# Patient Record
Sex: FEMALE | Race: WHITE | NOT HISPANIC OR LATINO | Employment: UNEMPLOYED | URBAN - NONMETROPOLITAN AREA
[De-identification: names, ages, dates, MRNs, and addresses within clinical notes are randomized per-mention and may not be internally consistent; named-entity substitution may affect disease eponyms.]

---

## 2017-09-21 ENCOUNTER — TRANSCRIBE ORDERS (OUTPATIENT)
Dept: ADMINISTRATIVE | Facility: HOSPITAL | Age: 77
End: 2017-09-21

## 2017-09-21 DIAGNOSIS — R55 SYNCOPE AND COLLAPSE: Primary | ICD-10-CM

## 2017-09-25 ENCOUNTER — TRANSCRIBE ORDERS (OUTPATIENT)
Dept: ADMINISTRATIVE | Facility: HOSPITAL | Age: 77
End: 2017-09-25

## 2017-09-25 ENCOUNTER — HOSPITAL ENCOUNTER (OUTPATIENT)
Dept: CARDIOLOGY | Facility: HOSPITAL | Age: 77
Discharge: HOME | End: 2017-09-25
Admitting: NURSE PRACTITIONER

## 2017-09-25 ENCOUNTER — HOSPITAL ENCOUNTER (OUTPATIENT)
Dept: RESPIRATORY THERAPY | Facility: HOSPITAL | Age: 77
Discharge: HOME | End: 2017-09-25

## 2017-09-25 DIAGNOSIS — R55 SYNCOPE AND COLLAPSE: ICD-10-CM

## 2017-09-25 DIAGNOSIS — R55 SYNCOPE AND COLLAPSE: Primary | ICD-10-CM

## 2017-09-25 PROCEDURE — 93306 TTE W/DOPPLER COMPLETE: CPT

## 2017-09-25 PROCEDURE — 93226 XTRNL ECG REC<48 HR SCAN A/R: CPT

## 2017-09-25 PROCEDURE — 93225 XTRNL ECG REC<48 HRS REC: CPT

## 2017-09-25 PROCEDURE — 93227 XTRNL ECG REC<48 HR R&I: CPT | Performed by: INTERNAL MEDICINE

## 2017-09-25 PROCEDURE — 93306 TTE W/DOPPLER COMPLETE: CPT | Performed by: INTERNAL MEDICINE

## 2017-09-26 LAB
BH CV ECHO MEAS - % IVS THICK: 43.7 %
BH CV ECHO MEAS - % LVPW THICK: 85.9 %
BH CV ECHO MEAS - ACS: 2.1 CM
BH CV ECHO MEAS - AO ROOT AREA (BSA CORRECTED): 1.6
BH CV ECHO MEAS - AO ROOT AREA: 7.1 CM^2
BH CV ECHO MEAS - AO ROOT DIAM: 3 CM
BH CV ECHO MEAS - BSA(HAYCOCK): 1.9 M^2
BH CV ECHO MEAS - BSA: 1.9 M^2
BH CV ECHO MEAS - BZI_BMI: 25.8 KILOGRAMS/M^2
BH CV ECHO MEAS - BZI_METRIC_HEIGHT: 170.2 CM
BH CV ECHO MEAS - BZI_METRIC_WEIGHT: 74.8 KG
BH CV ECHO MEAS - CONTRAST EF 4CH: 66.2 ML/M^2
BH CV ECHO MEAS - EDV(CUBED): 121.3 ML
BH CV ECHO MEAS - EDV(MOD-SP4): 68 ML
BH CV ECHO MEAS - EDV(TEICH): 115.5 ML
BH CV ECHO MEAS - EF(CUBED): 69.2 %
BH CV ECHO MEAS - EF(TEICH): 60.6 %
BH CV ECHO MEAS - ESV(CUBED): 37.3 ML
BH CV ECHO MEAS - ESV(MOD-SP4): 23 ML
BH CV ECHO MEAS - ESV(TEICH): 45.5 ML
BH CV ECHO MEAS - FS: 32.5 %
BH CV ECHO MEAS - IVS/LVPW: 1.1
BH CV ECHO MEAS - IVSD: 0.94 CM
BH CV ECHO MEAS - IVSS: 1.4 CM
BH CV ECHO MEAS - LA DIMENSION: 2.7 CM
BH CV ECHO MEAS - LA/AO: 0.88
BH CV ECHO MEAS - LV DIASTOLIC VOL/BSA (35-75): 36.5 ML/M^2
BH CV ECHO MEAS - LV MASS(C)D: 152.5 GRAMS
BH CV ECHO MEAS - LV MASS(C)DI: 81.8 GRAMS/M^2
BH CV ECHO MEAS - LV MASS(C)S: 171.5 GRAMS
BH CV ECHO MEAS - LV MASS(C)SI: 92.1 GRAMS/M^2
BH CV ECHO MEAS - LV SYSTOLIC VOL/BSA (12-30): 12.3 ML/M^2
BH CV ECHO MEAS - LVIDD: 5.9 CM
BH CV ECHO MEAS - LVIDS: 3.3 CM
BH CV ECHO MEAS - LVLD AP4: 6.3 CM
BH CV ECHO MEAS - LVLS AP4: 5.4 CM
BH CV ECHO MEAS - LVOT AREA (M): 2.5 CM^2
BH CV ECHO MEAS - LVOT AREA: 2.6 CM^2
BH CV ECHO MEAS - LVOT DIAM: 1.8 CM
BH CV ECHO MEAS - LVPWD: 0.83 CM
BH CV ECHO MEAS - LVPWS: 1.5 CM
BH CV ECHO MEAS - MV A MAX VEL: 67.9 CM/SEC
BH CV ECHO MEAS - MV E MAX VEL: 77.6 CM/SEC
BH CV ECHO MEAS - MV E/A: 1.1
BH CV ECHO MEAS - PA ACC SLOPE: 1388 CM/SEC^2
BH CV ECHO MEAS - PA ACC TIME: 0.09 SEC
BH CV ECHO MEAS - PA PR(ACCEL): 39.4 MMHG
BH CV ECHO MEAS - RAP SYSTOLE: 10 MMHG
BH CV ECHO MEAS - RVSP: 42.2 MMHG
BH CV ECHO MEAS - SI(CUBED): 45.1 ML/M^2
BH CV ECHO MEAS - SI(MOD-SP4): 24.2 ML/M^2
BH CV ECHO MEAS - SI(TEICH): 37.6 ML/M^2
BH CV ECHO MEAS - SV(CUBED): 84 ML
BH CV ECHO MEAS - SV(MOD-SP4): 45 ML
BH CV ECHO MEAS - SV(TEICH): 70 ML
BH CV ECHO MEAS - TR MAX VEL: 283.7 CM/SEC

## 2017-10-30 ENCOUNTER — OFFICE VISIT (OUTPATIENT)
Dept: CARDIOLOGY | Facility: CLINIC | Age: 77
End: 2017-10-30

## 2017-10-30 ENCOUNTER — CLINICAL SUPPORT (OUTPATIENT)
Dept: CARDIOLOGY | Facility: CLINIC | Age: 77
End: 2017-10-30

## 2017-10-30 VITALS
BODY MASS INDEX: 26.2 KG/M2 | HEART RATE: 73 BPM | SYSTOLIC BLOOD PRESSURE: 125 MMHG | RESPIRATION RATE: 16 BRPM | HEIGHT: 66 IN | DIASTOLIC BLOOD PRESSURE: 81 MMHG | WEIGHT: 163 LBS

## 2017-10-30 DIAGNOSIS — I10 ESSENTIAL HYPERTENSION: ICD-10-CM

## 2017-10-30 DIAGNOSIS — R55 SYNCOPE, UNSPECIFIED SYNCOPE TYPE: Primary | ICD-10-CM

## 2017-10-30 DIAGNOSIS — R55 SYNCOPE, UNSPECIFIED SYNCOPE TYPE: ICD-10-CM

## 2017-10-30 PROCEDURE — 99204 OFFICE O/P NEW MOD 45 MIN: CPT | Performed by: INTERNAL MEDICINE

## 2017-10-30 PROCEDURE — 93000 ELECTROCARDIOGRAM COMPLETE: CPT | Performed by: INTERNAL MEDICINE

## 2017-10-30 RX ORDER — MECLIZINE HCL 25MG 25 MG/1
25 TABLET, CHEWABLE ORAL EVERY 8 HOURS SCHEDULED
COMMUNITY
End: 2019-01-05

## 2017-10-30 RX ORDER — ATENOLOL 25 MG/1
25 TABLET ORAL DAILY
COMMUNITY
End: 2017-11-30 | Stop reason: SDUPTHER

## 2017-10-30 NOTE — PROGRESS NOTES
GAIL Kate  Hiwot Mac  1940  10/30/2017    Patient Active Problem List   Diagnosis   • Syncope   • Essential hypertension       Dear GAIL Kate:    Subjective     Hiwot Mac is a 76 y.o. female with the problems as listed above, presents for complaints of syncope.    History of Present Illness: Ms. Mac is a pleasant 75  female with no history of known heart disease or coronary artery disease or known cardiac arrhythmias, has been referred here by her PCP after she had an episode of near syncope about 3 weeks ago while she was at work.  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  She regained consciousness within a few seconds.  She denied any associated symptoms of palpitations or chest pains or shortness of breath.  She tried to work but didn't feel good and decided to go home.  She was subsequently seen by her PCP and then referred here for further evaluation.  She thinks that she may have had some allergic reaction as she claims allergy to dye in the fabrics in the past. She has not had any further episodes of significant dizziness or syncope since then.  She does get mildly dizzy at times especially when she turns her head.  She has history of sinusitis.  She denies any orthostatic dizziness.    She denies any history of chest pain or shortness of breath.  She denies any palpitations or history of known cardiac arrhythmias.  She had a recent echo Doppler study which revealed overall preserved LV systolic function with mild mitral regurgitation and mild tricuspid regurgitation with evidence of mild pulmonary hypertension.    Allergies   Allergen Reactions   • Penicillins Itching   :      Current Outpatient Prescriptions:   •  atenolol (TENORMIN) 25 MG tablet, Take 25 mg by mouth Daily., Disp: , Rfl:   •  meclizine 25 MG chewable  "tablet chewable tablet, Chew 25 mg Every 8 (Eight) Hours., Disp: , Rfl:     Past Medical History:   Diagnosis Date   • Hypertension    • Seasonal allergies      History reviewed. No pertinent surgical history.  Family History   Problem Relation Age of Onset   • Heart attack Father      Social History   Substance Use Topics   • Smoking status: Never Smoker   • Smokeless tobacco: Never Used   • Alcohol use No       Review of Systems   Constitution: Negative for chills and fever.   HENT: Negative for nosebleeds and sore throat.    Cardiovascular: Positive for near-syncope, palpitations and syncope.   Respiratory: Positive for cough and shortness of breath. Negative for hemoptysis and wheezing.    Musculoskeletal: Positive for back pain and joint pain.   Gastrointestinal: Negative for abdominal pain, hematemesis, hematochezia, melena, nausea and vomiting.   Genitourinary: Positive for frequency. Negative for dysuria and hematuria.   Neurological: Positive for dizziness and numbness. Negative for headaches.   Psychiatric/Behavioral: Positive for memory loss.       Objective   Blood pressure 125/81, pulse 73, resp. rate 16, height 66\" (167.6 cm), weight 163 lb (73.9 kg).  Body mass index is 26.31 kg/(m^2).        Physical Exam   Constitutional: She is oriented to person, place, and time. She appears well-developed and well-nourished.   HENT:   Head: Normocephalic.   Eyes: Conjunctivae and EOM are normal.   Neck: Normal range of motion. Neck supple. No JVD present. No tracheal deviation present. No thyromegaly present.   Cardiovascular: Normal rate, regular rhythm, S1 normal and S2 normal.  Exam reveals no gallop, no S3, no S4 and no friction rub.    No murmur heard.  Pulmonary/Chest: Breath sounds normal. No respiratory distress. She has no wheezes. She has no rales.   Abdominal: Soft. Bowel sounds are normal. She exhibits no mass. There is no tenderness.   Musculoskeletal: She exhibits no edema.   Neurological: She is " alert and oriented to person, place, and time. No cranial nerve deficit.   Skin: Skin is warm and dry.   Psychiatric: She has a normal mood and affect.     Hiwot Mac   Echocardiogram   Order# [id number]   Reading physician:   Mohinder Quiroz MD Ordering physician:   GAIL Beltre Study date:  9/25/17     · The left ventricular cavity is mildly dilated.  · Estimated EF appears to be in the range of 51 - 55%  · The aortic valve is structurally normal. Mild aortic valve regurgitation is present. No aortic valve stenosis is present.  · The mitral valve is grossly normal in structure. Mild mitral valve regurgitation is present. No significant mitral valve stenosis is present.  · The tricuspid valve is normal. No tricuspid valve stenosis is present. Mild tricuspid valve regurgitation is present. Estimated right ventricular systolic pressure from tricuspid regurgitation is mildly elevated (35-45 mmHg).  · Mild pulmonary hypertension is present.  · There is no evidence of pericardial effusion.               ECG 12 Lead  Date/Time: 10/30/2017 12:05 PM  Performed by: MOHINDER QUIROZ  Authorized by: MOHINDER QUIROZ   Rhythm: sinus rhythm  BPM: 71  Conduction: conduction normal  ST Segments: ST segments normal  Comments: Nonspecific ST-T wave changes in the anterolateral leads.            Assessment/Plan   Diagnoses and all orders for this visit:    1. Syncope, unspecified syncope type.  2. Essential hypertension, Controlled.     Recommendations:    1. We will evaluate further with event monitor and carotid duplex scan.  2. Follow-up in 2-3 months or sooner if needed.    Return in about 2 months (around 12/30/2017).    As always, I appreciate very much the opportunity to participate in the cardiovascular care of your patients.      With Best Regards,    Mohinder Quiroz MD, [facility]    Dragon disclaimer:  Much of this encounter note is an electronic transcription/translation of spoken language to printed text. The  electronic translation of spoken language may permit erroneous, or at times, nonsensical words or phrases to be inadvertently transcribed; Although I have reviewed the note for such errors, some may still exist.

## 2017-11-03 ENCOUNTER — TELEPHONE (OUTPATIENT)
Dept: CARDIOLOGY | Facility: CLINIC | Age: 77
End: 2017-11-03

## 2017-11-03 NOTE — TELEPHONE ENCOUNTER
Called pt and advised her that we got a reading back from her event monitor and  wanted her to come in sooner. I asked her if it would be okay if she saw 's PA. She stated that it would be fine to see her. I made her an apt for 11.21.17 at 2:00 pm.

## 2017-11-28 ENCOUNTER — TELEPHONE (OUTPATIENT)
Dept: CARDIOLOGY | Facility: CLINIC | Age: 77
End: 2017-11-28

## 2017-11-28 NOTE — TELEPHONE ENCOUNTER
Patient having intermittent short runs of wide complex tachycardia on event monitor. She was asked to come in sooner, but no showed the appt. See if she could see me at 1:20 this Thursday. If so, please have her added to the schedule.

## 2017-11-30 ENCOUNTER — OFFICE VISIT (OUTPATIENT)
Dept: CARDIOLOGY | Facility: CLINIC | Age: 77
End: 2017-11-30

## 2017-11-30 ENCOUNTER — TELEPHONE (OUTPATIENT)
Dept: CARDIOLOGY | Facility: CLINIC | Age: 77
End: 2017-11-30

## 2017-11-30 VITALS
WEIGHT: 163 LBS | SYSTOLIC BLOOD PRESSURE: 157 MMHG | DIASTOLIC BLOOD PRESSURE: 80 MMHG | RESPIRATION RATE: 16 BRPM | BODY MASS INDEX: 26.2 KG/M2 | HEIGHT: 66 IN | HEART RATE: 59 BPM

## 2017-11-30 DIAGNOSIS — I47.1 SUPRAVENTRICULAR TACHYCARDIA (HCC): ICD-10-CM

## 2017-11-30 DIAGNOSIS — R00.0 WIDE-COMPLEX TACHYCARDIA: Primary | ICD-10-CM

## 2017-11-30 DIAGNOSIS — I10 ESSENTIAL HYPERTENSION: ICD-10-CM

## 2017-11-30 DIAGNOSIS — I48.92 ATRIAL FLUTTER, UNSPECIFIED TYPE (HCC): ICD-10-CM

## 2017-11-30 PROCEDURE — 99214 OFFICE O/P EST MOD 30 MIN: CPT | Performed by: PHYSICIAN ASSISTANT

## 2017-11-30 RX ORDER — ATENOLOL 25 MG/1
37.5 TABLET ORAL DAILY
Qty: 45 TABLET | Refills: 5 | Status: SHIPPED | OUTPATIENT
Start: 2017-11-30

## 2017-11-30 NOTE — PROGRESS NOTES
" GAIL Kate  Hiwot Mac  1940 11/30/2017    Patient Active Problem List   Diagnosis   • Syncope   • Essential hypertension     Dear GAIL Kate:    Chief Complaint   Patient presents with   • Follow-up     event monitor, states wore about 3 weeks   • Dizziness     better   • meds     \"same as before\"     Subjective     Hiwot Mac is a 77 y.o. female with a past medical history significant for Hypertension and a previous episode of syncope.  She presents back to the office today for follow-up visit after an event recorder. She previously had a 24-hour Holter monitor in September 2017 which revealed some intermittent SVT and runs of atrial flutter.  She subsequently had a recent event monitor revealing intermittent episodes of 3-5 beat wide complex tachycardia, possibly ventricular tachycardia.  She presents back to the office today for a follow-up visit.  She continues to have intermittent dizziness, but no syncopal episodes.  Denies any chest pains or discomfort.  No shortness of breath.  She denies any known history of congestive heart failure, diabetes mellitus, CVA/TIA, or vascular disease.  No previously known history of significant bleeding issue other than some epistaxis with aspirin in the past.  Denies any bright red blood per rectum or black stools.  No history of blood transfusions.      Current Outpatient Prescriptions:   •  atenolol (TENORMIN) 25 MG tablet, Take 1.5 tablets by mouth Daily., Disp: 45 tablet, Rfl: 5  •  meclizine 25 MG chewable tablet chewable tablet, Chew 25 mg Every 8 (Eight) Hours., Disp: , Rfl:     The following portions of the patient's history were reviewed and updated as appropriate: allergies, current medications, past family history, past medical history, past social history, past surgical history and problem list.    Social History     Social History   • Marital status:      Spouse name: N/A   • Number of children: N/A   • Years of " "education: N/A     Occupational History   • Not on file.     Social History Main Topics   • Smoking status: Never Smoker   • Smokeless tobacco: Never Used   • Alcohol use No   • Drug use: No   • Sexual activity: Not on file     Other Topics Concern   • Not on file     Social History Narrative     Review of Systems   HENT: Positive for nosebleeds (In the past.  No recent.).    Cardiovascular: Negative for chest pain, dyspnea on exertion, leg swelling, near-syncope and syncope.   Respiratory: Negative for shortness of breath.    Hematologic/Lymphatic: Negative for bleeding problem. Does not bruise/bleed easily.   Gastrointestinal: Negative for hematochezia and melena.   Genitourinary: Negative for hematuria.   Neurological: Positive for dizziness.     Objective   Blood pressure 157/80, pulse 59, resp. rate 16, height 66\" (167.6 cm), weight 163 lb (73.9 kg).    Physical Exam   Constitutional: She is oriented to person, place, and time. She appears well-developed and well-nourished. No distress.   HENT:   Head: Normocephalic and atraumatic.   Eyes: Conjunctivae are normal. Right eye exhibits no discharge. Left eye exhibits no discharge.   Neck: Normal range of motion. Neck supple. Carotid bruit is not present.   Cardiovascular: Normal rate, regular rhythm and normal heart sounds.  Exam reveals no gallop and no friction rub.    No murmur heard.  Pulmonary/Chest: Effort normal and breath sounds normal. No respiratory distress. She has no wheezes. She has no rales. She exhibits no tenderness.   Musculoskeletal: Normal range of motion. She exhibits no edema.   Neurological: She is alert and oriented to person, place, and time.   Skin: Skin is warm and dry. No rash noted. She is not diaphoretic. No erythema. No pallor.   Psychiatric: She has a normal mood and affect. Her behavior is normal.   Nursing note and vitals reviewed.    Procedures   24-hour Holter monitor 9/25/17  · The predominant rhythm noted during the testing " period was sinus rhythm  · Average HR: 79. Min HR: 54. Max HR: 133.  · Multiple runs of SVT with intermittant atrial flutter with VR around 130s.  · Occasional PVCs.No V.Tach  · Sinoatrial node conduction was normal. No atrioventricular block noted.  · Patient diary was not submitted    Transthoracic echocardiogram 9/25/17  · The left ventricular cavity is mildly dilated.  · Estimated EF appears to be in the range of 51 - 55%  · The aortic valve is structurally normal. Mild aortic valve regurgitation is present. No aortic valve stenosis is present.  · The mitral valve is grossly normal in structure. Mild mitral valve regurgitation is present. No significant mitral valve stenosis is present.  · The tricuspid valve is normal. No tricuspid valve stenosis is present. Mild tricuspid valve regurgitation is present. Estimated right ventricular systolic pressure from tricuspid regurgitation is mildly elevated (35-45 mmHg).  · Mild pulmonary hypertension is present.  · There is no evidence of pericardial effusion.    Assessment:          Diagnosis Plan   1. Wide-complex tachycardia  TSH    Comprehensive Metabolic Panel    Magnesium    CBC & Differential    Short runs of wide-complex tachycardia (3-5 beats) on recent event recorder.   2. Atrial flutter, unspecified type  TSH    Comprehensive Metabolic Panel    Magnesium    CBC & Differential   3. Essential hypertension  Comprehensive Metabolic Panel    CBC & Differential   4. Supraventricular tachycardia   TSH        Plan:       1. I discussed the case with Dr. Harrison.  With her intermittent atrial flutter and her chads VASC score of 4 (age, hypertension, and female gender), her risk for stroke is great enough to put her on chronic oral anticoagulation.  I discussed with her about the risks and benefits of the various anticoagulants and the risk of stroke without chronic oral anticoagulation. She is willing to go on one of the newer anticoagulants.  She denies any history of  bleeding issues.  No recent bright red blood per rectum or black stools.  She states that she is able to walk without falling.  2. We'll start Eliquis 5 mg twice daily.  She was given 3 weeks worth of samples and a free thirty-day card to take to her pharmacy. She qualifies for a 5 mg twice daily dose due to age at 77 and weight is 73.9 kg.  I have asked her to monitor for evidence of bleeding and to let us know as soon as possible should she develop these.  3. We'll have the cost of Eliquis checked through her insurance and continue if affordable.  4. I discussed with her about her wide-complex tachycardia and the possibility of having coronary artery disease causing these.  She expressed understanding, however, she refuses further evaluation with stress testing.  I have explained the risks of not evaluating further.  She expressed understanding and continues to refuse.  I explained that we just have to explain the risk and that ultimately it is her decision.  5. She is willing to get blood work.  We'll check a CBC, BMP, TSH, and magnesium level.  6. We will follow-up in 2 months or sooner if needed.  She was encouraged to call if she decides to complete the stress test.     Return in about 2 years (around 11/30/2019).    I appreciate the opportunity to participate in this patient's cardiovascular care.    Best Regards,    Angie Quinones PA-C

## 2017-11-30 NOTE — TELEPHONE ENCOUNTER
----- Message from IRVIN Chacon sent at 11/30/2017  4:02 PM EST -----  Please check on the cost Eliquis 5 mg twice daily.    I called the pharmacy and they stated that her insurance paid 300 and [phone] dollars and her co pay would be 121.97.

## 2017-12-22 ENCOUNTER — DOCUMENTATION (OUTPATIENT)
Dept: CARDIOLOGY | Facility: CLINIC | Age: 77
End: 2017-12-22

## 2017-12-22 NOTE — PROGRESS NOTES
Called and left a VM for patient to return call , needs to get labs performed and needs to follow up next week in our office per Dr. Harrison to go over her holter monitor results.       Made her an appt with Urszula Dec 29th.  Per Dr. Harrison

## 2018-01-04 ENCOUNTER — TELEPHONE (OUTPATIENT)
Dept: CARDIOLOGY | Facility: CLINIC | Age: 78
End: 2018-01-04

## 2018-01-04 NOTE — TELEPHONE ENCOUNTER
Noted. I explained the risks of stroke with Afib thoroughly while she was in the office. If she still refuses that is her choice to do so.

## 2018-01-04 NOTE — TELEPHONE ENCOUNTER
Called patient to speak with her in regards about the copay of her Eliquis she informed me that she was not going to take the blood thinner that she had not taken the samples that we had given her in the office and that she did not plan on it so it did not matter how much it cost that she thinks her blood it thin enough and she did not want to be messed with any more with this.

## 2019-01-05 ENCOUNTER — OFFICE VISIT (OUTPATIENT)
Dept: RETAIL CLINIC | Facility: CLINIC | Age: 79
End: 2019-01-05

## 2019-01-05 VITALS
SYSTOLIC BLOOD PRESSURE: 130 MMHG | TEMPERATURE: 98.3 F | DIASTOLIC BLOOD PRESSURE: 80 MMHG | WEIGHT: 168 LBS | OXYGEN SATURATION: 98 % | BODY MASS INDEX: 27.12 KG/M2 | HEART RATE: 82 BPM | RESPIRATION RATE: 14 BRPM

## 2019-01-05 DIAGNOSIS — I10 ESSENTIAL HYPERTENSION: Primary | ICD-10-CM

## 2019-01-05 PROCEDURE — 99202 OFFICE O/P NEW SF 15 MIN: CPT | Performed by: NURSE PRACTITIONER

## 2019-01-05 RX ORDER — ATENOLOL 25 MG/1
25 TABLET ORAL DAILY
Qty: 30 TABLET | Refills: 0 | Status: SHIPPED | OUTPATIENT
Start: 2019-01-05

## 2019-01-05 NOTE — PROGRESS NOTES
Hiwot presents to the clinic today requesting a refill of her Atenolol 25 mg which she uses daily if needed for her blood pressure. Her pharmacy is closed this afternoon and she is out of her medication. . She denies chest pain, edema, or shortness of breath.     Hypertension   The current episode started more than 1 year ago. The problem has been waxing and waning since onset. Pertinent negatives include no blurred vision, chest pain, headaches, malaise/fatigue, orthopnea, palpitations, peripheral edema, PND or shortness of breath. There are no associated agents to hypertension. Risk factors for coronary artery disease include family history. Current antihypertension treatment includes beta blockers.      Vitals:    01/05/19 1726   BP: 130/80   Pulse: 82   Resp: 14   Temp: 98.3 °F (36.8 °C)   SpO2: 98%       The following portions of the patient's history were reviewed and updated as appropriate: allergies, current medications, past family history, past medical history, past social history, past surgical history and problem list.    Review of Systems   Constitutional: Negative for activity change, appetite change, fatigue and malaise/fatigue.   Eyes: Negative for blurred vision and visual disturbance.   Respiratory: Negative for cough, shortness of breath and wheezing.    Cardiovascular: Negative for chest pain, palpitations, orthopnea, leg swelling and PND.   Gastrointestinal: Negative for nausea and vomiting.   Skin: Negative for color change and rash.   Neurological: Negative for headaches.     Physical Exam   Constitutional: She is oriented to person, place, and time. She appears well-developed and well-nourished. No distress.   HENT:   Head: Normocephalic.   Nose: Nose normal.   Mouth/Throat: Oropharynx is clear and moist and mucous membranes are normal.   Eyes: Conjunctivae are normal. Pupils are equal, round, and reactive to light. Right eye exhibits no discharge. Left eye exhibits no discharge. No scleral  icterus.   Neck: Neck supple.   Cardiovascular: Normal rate, regular rhythm and normal heart sounds. Exam reveals no friction rub.   No murmur heard.  Pulmonary/Chest: Effort normal and breath sounds normal. No respiratory distress. She has no wheezes. She has no rales.   Musculoskeletal: She exhibits no edema.   Lymphadenopathy:     She has no cervical adenopathy.   Neurological: She is alert and oriented to person, place, and time.   Skin: Skin is warm and dry. No rash noted. No erythema.   Psychiatric: She has a normal mood and affect. Her behavior is normal. Judgment and thought content normal.   Vitals reviewed.    Assessment/Plan   Problems Addressed this Visit        Cardiovascular and Mediastinum    Essential hypertension - Primary    Relevant Medications    atenolol (TENORMIN) 25 MG tablet        Findings and recommendations discussed with Hiwot. Medication refill sent to [facility] pharmacy. Encouraged her to f/u with her PCP per her scheduled appt; sooner if problems/concerns occur.

## 2023-11-24 ENCOUNTER — APPOINTMENT (OUTPATIENT)
Dept: GENERAL RADIOLOGY | Facility: HOSPITAL | Age: 83
End: 2023-11-24
Payer: MEDICARE

## 2023-11-24 ENCOUNTER — HOSPITAL ENCOUNTER (EMERGENCY)
Facility: HOSPITAL | Age: 83
Discharge: HOME | End: 2023-11-24
Attending: STUDENT IN AN ORGANIZED HEALTH CARE EDUCATION/TRAINING PROGRAM
Payer: MEDICARE

## 2023-11-24 VITALS
TEMPERATURE: 97.7 F | OXYGEN SATURATION: 97 % | HEART RATE: 80 BPM | BODY MASS INDEX: 25.9 KG/M2 | DIASTOLIC BLOOD PRESSURE: 80 MMHG | WEIGHT: 165 LBS | HEIGHT: 67 IN | SYSTOLIC BLOOD PRESSURE: 157 MMHG | RESPIRATION RATE: 14 BRPM

## 2023-11-24 DIAGNOSIS — S82.841A CLOSED BIMALLEOLAR FRACTURE OF RIGHT ANKLE, INITIAL ENCOUNTER: Primary | ICD-10-CM

## 2023-11-24 PROCEDURE — 73610 X-RAY EXAM OF ANKLE: CPT | Performed by: RADIOLOGY

## 2023-11-24 PROCEDURE — 73610 X-RAY EXAM OF ANKLE: CPT

## 2023-11-24 PROCEDURE — 99283 EMERGENCY DEPT VISIT LOW MDM: CPT

## 2023-11-24 RX ORDER — ACETAMINOPHEN 325 MG/1
650 TABLET ORAL ONCE
Status: COMPLETED | OUTPATIENT
Start: 2023-11-24 | End: 2023-11-24

## 2023-11-24 RX ADMIN — ACETAMINOPHEN 650 MG: 325 TABLET ORAL at 14:59

## 2023-11-24 NOTE — ED PROVIDER NOTES
Subjective   History of Present Illness  83-year-old female with past medical history of allergies and hypertension presents to the emergency room with right ankle pain.  Patient states she fell this morning while getting ready for work.  She denies hitting her head or loss of consciousness.  She does complain of pain to right ankle only.  Aggravating factors include bearing weight and movement.  Denies any alleviating factors.  Denies any other complaints or concerns at this time.    History provided by:  Patient   used: No        Review of Systems   Constitutional: Negative.  Negative for fever.   HENT: Negative.     Respiratory: Negative.     Cardiovascular: Negative.  Negative for chest pain.   Gastrointestinal: Negative.  Negative for abdominal pain.   Endocrine: Negative.    Genitourinary: Negative.  Negative for dysuria.   Musculoskeletal:         (+) right ankle pain   Skin: Negative.    Neurological: Negative.    Psychiatric/Behavioral: Negative.     All other systems reviewed and are negative.      Past Medical History:   Diagnosis Date    Allergic     Hypertension     Seasonal allergies        Allergies   Allergen Reactions    Penicillins Itching       No past surgical history on file.    Family History   Problem Relation Age of Onset    Heart attack Father        Social History     Socioeconomic History    Marital status:    Tobacco Use    Smoking status: Never    Smokeless tobacco: Never   Substance and Sexual Activity    Alcohol use: No    Drug use: No    Sexual activity: Defer           Objective   Physical Exam  Vitals and nursing note reviewed.   Constitutional:       General: She is not in acute distress.     Appearance: She is well-developed. She is not diaphoretic.   HENT:      Head: Normocephalic and atraumatic.      Right Ear: External ear normal.      Left Ear: External ear normal.      Nose: Nose normal.   Eyes:      Conjunctiva/sclera: Conjunctivae normal.       Pupils: Pupils are equal, round, and reactive to light.   Neck:      Vascular: No JVD.      Trachea: No tracheal deviation.   Cardiovascular:      Rate and Rhythm: Normal rate and regular rhythm.      Heart sounds: Normal heart sounds. No murmur heard.  Pulmonary:      Effort: Pulmonary effort is normal. No respiratory distress.      Breath sounds: Normal breath sounds. No wheezing.   Abdominal:      General: Bowel sounds are normal.      Palpations: Abdomen is soft.      Tenderness: There is no abdominal tenderness.   Musculoskeletal:         General: No deformity. Normal range of motion.      Cervical back: Normal range of motion and neck supple.      Right ankle: Swelling present. Tenderness present over the lateral malleolus and medial malleolus. Decreased range of motion.      Left ankle: Normal.   Skin:     General: Skin is warm and dry.      Coloration: Skin is not pale.      Findings: No erythema or rash.   Neurological:      Mental Status: She is alert and oriented to person, place, and time.      Cranial Nerves: No cranial nerve deficit.   Psychiatric:         Behavior: Behavior normal.         Thought Content: Thought content normal.         Splint - Cast - Strapping    Date/Time: 11/24/2023 2:42 PM    Performed by: Brady Michaud PA-C  Authorized by: Cira Givens DO    Consent:     Consent obtained:  Verbal    Consent given by:  Patient    Risks, benefits, and alternatives were discussed: yes      Risks discussed:  Discoloration, numbness, pain and swelling    Alternatives discussed:  No treatment, delayed treatment, alternative treatment, observation and referral  Universal protocol:     Procedure explained and questions answered to patient or proxy's satisfaction: yes      Relevant documents present and verified: yes      Test results available: yes      Imaging studies available: yes      Required blood products, implants, devices, and special equipment available: yes      Site/side marked:  yes      Immediately prior to procedure a time out was called: yes      Patient identity confirmed:  Verbally with patient, arm band, provided demographic data and hospital-assigned identification number  Pre-procedure details:     Distal neurologic exam:  Normal    Distal perfusion: distal pulses strong    Procedure details:     Location:  Ankle    Ankle location:  R ankle    Supplies:  Cotton padding, elastic bandage and fiberglass    Splint applied and adjusted personally by me: Splint applied by tech, checked by me..    Post-procedure details:     Distal neurologic exam:  Normal    Distal perfusion: distal pulses strong      Procedure completion:  Tolerated well, no immediate complications    Post-procedure imaging: not applicable    Comments:      Patient tolerated splint application well, per tech. No acute complications. Neurovascularly intact.               ED Course  ED Course as of 11/24/23 1832 Fri Nov 24, 2023   1429 XR Ankle 3+ View Right [TK]      ED Course User Index  [TK] Brady Michaud, PAJanetC           XR Ankle 3+ View Right   Final Result     Bimalleolar fracture as described above with minimal valgus angulation   of distal fracture fragments.           This report was finalized on 11/24/2023 2:10 PM by Dr. Jasperet Herring MD.                                              Medical Decision Making  83-year-old female with past medical history of allergies and hypertension presents to the emergency room with right ankle pain.  Patient states she fell this morning while getting ready for work.  She denies hitting her head or loss of consciousness.  She does complain of pain to right ankle only.  Aggravating factors include bearing weight and movement.  Denies any alleviating factors.  Denies any other complaints or concerns at this time.      Problems Addressed:  Closed bimalleolar fracture of right ankle, initial encounter: complicated acute illness or injury    Amount and/or Complexity of Data  Reviewed  Radiology: ordered. Decision-making details documented in ED Course.    Risk  OTC drugs.        Final diagnoses:   Closed bimalleolar fracture of right ankle, initial encounter       ED Disposition  ED Disposition       ED Disposition   Discharge    Condition   Stable    Comment   --               Bob Rizo, DO  [address] [place] [address]  [phone]    Schedule an appointment as soon as possible for a visit in 2 days           Medication List        Changed      * atenolol 25 MG tablet  Commonly known as: TENORMIN  Take 1.5 tablets by mouth Daily.  What changed:   how much to take  when to take this  reasons to take this     * atenolol 25 MG tablet  Commonly known as: TENORMIN  Take 1 tablet by mouth Daily.  What changed: Another medication with the same name was changed. Make sure you understand how and when to take each.           * This list has 2 medication(s) that are the same as other medications prescribed for you. Read the directions carefully, and ask your doctor or other care provider to review them with you.                     Brady Michaud PA-C  11/24/23 0616